# Patient Record
Sex: FEMALE | Race: ASIAN | NOT HISPANIC OR LATINO | Employment: PART TIME | URBAN - METROPOLITAN AREA
[De-identification: names, ages, dates, MRNs, and addresses within clinical notes are randomized per-mention and may not be internally consistent; named-entity substitution may affect disease eponyms.]

---

## 2019-11-16 ENCOUNTER — HOSPITAL ENCOUNTER (EMERGENCY)
Facility: HOSPITAL | Age: 59
Discharge: HOME | End: 2019-11-17
Attending: EMERGENCY MEDICINE | Admitting: EMERGENCY MEDICINE
Payer: MEDICAID

## 2019-11-16 DIAGNOSIS — R07.9 CHEST PAIN, UNSPECIFIED: Primary | ICD-10-CM

## 2019-11-16 DIAGNOSIS — E87.6 HYPOKALEMIA: ICD-10-CM

## 2019-11-16 PROCEDURE — 99285 EMERGENCY DEPT VISIT HI MDM: CPT

## 2019-11-17 ENCOUNTER — APPOINTMENT (EMERGENCY)
Dept: RADIOLOGY | Facility: HOSPITAL | Age: 59
End: 2019-11-17
Attending: EMERGENCY MEDICINE
Payer: MEDICAID

## 2019-11-17 VITALS
OXYGEN SATURATION: 96 % | SYSTOLIC BLOOD PRESSURE: 118 MMHG | RESPIRATION RATE: 15 BRPM | DIASTOLIC BLOOD PRESSURE: 65 MMHG | HEART RATE: 62 BPM | TEMPERATURE: 99.4 F

## 2019-11-17 LAB
ALBUMIN SERPL BCP-MCNC: 3.4 G/DL (ref 3.5–5)
ALP SERPL-CCNC: 77 U/L (ref 46–116)
ALT SERPL W P-5'-P-CCNC: 109 U/L (ref 12–78)
ANION GAP SERPL CALCULATED.3IONS-SCNC: 8 MMOL/L (ref 4–13)
AST SERPL W P-5'-P-CCNC: 58 U/L (ref 5–45)
BASOPHILS # BLD AUTO: 0.05 THOUSANDS/ΜL (ref 0–0.1)
BASOPHILS NFR BLD AUTO: 1 % (ref 0–1)
BILIRUB SERPL-MCNC: 0.3 MG/DL (ref 0.2–1)
BUN SERPL-MCNC: 17 MG/DL (ref 5–25)
CALCIUM SERPL-MCNC: 8.7 MG/DL (ref 8.3–10.1)
CHLORIDE SERPL-SCNC: 103 MMOL/L (ref 100–108)
CO2 SERPL-SCNC: 27 MMOL/L (ref 21–32)
CREAT SERPL-MCNC: 0.94 MG/DL (ref 0.6–1.3)
EOSINOPHIL # BLD AUTO: 0.07 THOUSAND/ΜL (ref 0–0.61)
EOSINOPHIL NFR BLD AUTO: 1 % (ref 0–6)
ERYTHROCYTE [DISTWIDTH] IN BLOOD BY AUTOMATED COUNT: 13.3 % (ref 11.6–15.1)
GFR SERPL CREATININE-BSD FRML MDRD: 66 ML/MIN/1.73SQ M
GLUCOSE SERPL-MCNC: 167 MG/DL (ref 65–140)
HCT VFR BLD AUTO: 41.4 % (ref 34.8–46.1)
HGB BLD-MCNC: 13.4 G/DL (ref 11.5–15.4)
IMM GRANULOCYTES # BLD AUTO: 0.01 THOUSAND/UL (ref 0–0.2)
IMM GRANULOCYTES NFR BLD AUTO: 0 % (ref 0–2)
LYMPHOCYTES # BLD AUTO: 1.7 THOUSANDS/ΜL (ref 0.6–4.47)
LYMPHOCYTES NFR BLD AUTO: 27 % (ref 14–44)
MCH RBC QN AUTO: 29.3 PG (ref 26.8–34.3)
MCHC RBC AUTO-ENTMCNC: 32.4 G/DL (ref 31.4–37.4)
MCV RBC AUTO: 91 FL (ref 82–98)
MONOCYTES # BLD AUTO: 0.49 THOUSAND/ΜL (ref 0.17–1.22)
MONOCYTES NFR BLD AUTO: 8 % (ref 4–12)
NEUTROPHILS # BLD AUTO: 3.96 THOUSANDS/ΜL (ref 1.85–7.62)
NEUTS SEG NFR BLD AUTO: 63 % (ref 43–75)
NRBC BLD AUTO-RTO: 0 /100 WBCS
PLATELET # BLD AUTO: 203 THOUSANDS/UL (ref 149–390)
PMV BLD AUTO: 9.8 FL (ref 8.9–12.7)
POTASSIUM SERPL-SCNC: 3.3 MMOL/L (ref 3.5–5.3)
PROT SERPL-MCNC: 7.2 G/DL (ref 6.4–8.2)
RBC # BLD AUTO: 4.57 MILLION/UL (ref 3.81–5.12)
SODIUM SERPL-SCNC: 138 MMOL/L (ref 136–145)
TROPONIN I SERPL-MCNC: <0.02 NG/ML
TROPONIN I SERPL-MCNC: <0.02 NG/ML
WBC # BLD AUTO: 6.28 THOUSAND/UL (ref 4.31–10.16)

## 2019-11-17 PROCEDURE — 93005 ELECTROCARDIOGRAM TRACING: CPT

## 2019-11-17 PROCEDURE — 80053 COMPREHEN METABOLIC PANEL: CPT | Performed by: EMERGENCY MEDICINE

## 2019-11-17 PROCEDURE — 71045 X-RAY EXAM CHEST 1 VIEW: CPT

## 2019-11-17 PROCEDURE — 84484 ASSAY OF TROPONIN QUANT: CPT | Performed by: EMERGENCY MEDICINE

## 2019-11-17 PROCEDURE — 85025 COMPLETE CBC W/AUTO DIFF WBC: CPT | Performed by: EMERGENCY MEDICINE

## 2019-11-17 PROCEDURE — 36415 COLL VENOUS BLD VENIPUNCTURE: CPT | Performed by: EMERGENCY MEDICINE

## 2019-11-17 RX ORDER — POTASSIUM CHLORIDE 20 MEQ/1
40 TABLET, EXTENDED RELEASE ORAL ONCE
Status: COMPLETED | OUTPATIENT
Start: 2019-11-17 | End: 2019-11-17

## 2019-11-17 RX ADMIN — POTASSIUM CHLORIDE 40 MEQ: 1500 TABLET, EXTENDED RELEASE ORAL at 02:16

## 2019-11-17 NOTE — ED NOTES
Language line used to advise and educate patient about potassium being a little low and will need to take the 2 pills ordered to increase level     Roverto Hopson RN  11/17/19 0509

## 2019-11-17 NOTE — ED NOTES
Patients discharge instructions given with assistance of interpretor via phone, patient voiced concerns regarding blood work and dry mouth, advised labs and EKG were normal, requested to sleep for 1 hour and did report brother will be picking up patient in the am, will be coming from [facility], [address] [place] [address]  11/17/19 0593

## 2019-11-17 NOTE — ED NOTES
Patient repeatedly voicing concerns of claustrophobia while on phone with ,      Maggy Encarnacion RN  11/17/19 3402

## 2019-11-17 NOTE — DISCHARGE INSTRUCTIONS
Chest Pain, Ambulatory Care   Barney LINCOLN: Evaluation of chest pain in primary care patients  Am Fam Physician 2011; 83(5):603-605  Shwetha WAN, Jihan DB, & Ramona Krishna: Emergency department and office-based evaluation of patients with chest pain  Díaz Clin Proc 2010; 85(3):284-299  Marleni JS, Irl Lucas, et al: NICE guidance  Chest pain of recent onset: assessment and diagnosis of recent onset chest pain or discomfort of suspected cardiac origin  Heart 2010; 96(12):974-978  Zulma Search E: Chest Pain  In: Aubrie Tish, Kay RESTREPO, [employer], et al, eds  [address] Emergency Medicine Consult, 4th ed  [address] [facility],[address] [place], [place], 2011  [address] [place] [address]: An algorithm for the diagnosis and management of chest pain in primary care  Med Clin [place] 2010; 42(8):942-373  © 2014 [address] is for End User's use only and may not be sold, redistributed or otherwise used for commercial purposes  All illustrations and images included in CareNotes® are the copyrighted property of A D A M , Inc  or [employer]  The above information is an  only  It is not intended as medical advice for individual conditions or treatments  Talk to your doctor, nurse or pharmacist before following any medical regimen to see if it is safe and effective for you  Return to the ER for further concerns  Chest Pain, Ambulatory Care   GENERAL INFORMATION:   Chest pain  can be caused by a range of conditions, from not serious to life-threatening  It may be caused by a heart attack or a blood clot in your lungs  Sometimes chest pain or pressure is caused by poor blood flow to your heart (angina)  Infection, inflammation, or a fracture in the bones or cartilage in your chest can cause pain or discomfort  Chest pain can also be a symptom of a digestive problem, such as acid reflux or a stomach ulcer     Common symptoms include the following: · Fever or sweating     · Nausea or vomiting     · Shortness of breath     · Discomfort or pressure that spreads from your chest to your back, jaw, or arm     · A racing or slow heartbeat     · Feeling weak, tired, or faint  Seek immediate care for the following symptoms:   · Any of the following signs of a heart attack:      ¨ Squeezing, pressure, or pain in your chest that lasts longer than 5 minutes or returns    ¨ Discomfort or pain in your back, neck, jaw, stomach, or arm     ¨ Trouble breathing     ¨ Nausea or vomiting    ¨ Lightheadedness or a sudden cold sweat, especially with trouble breathing         · Chest discomfort that gets worse, even with medicine    · Coughing or vomiting blood    · Black or bloody bowel movements     · Vomiting that does not stop, or pain when you swallow  Treatment for chest pain  may include medicine to treat your symptoms while he determines the cause of your chest pain  You may also need any of the following:  · Antiplatelets , such as aspirin, help prevent blood clots  Take your antiplatelet medicine exactly as directed  These medicines make it more likely for you to bleed or bruise  If you are told to take aspirin, do not take acetaminophen or ibuprofen instead  · Prescription pain medicine  may be given  Ask how to take this medicine safely  Do not smoke: If you smoke, it is never too late to quit  Smoking increases your risk for a heart attack and other heart and lung conditions  Ask your healthcare provider for information about how to stop smoking if you need help  Follow up with your healthcare provider as directed: You may need more tests  You may be referred to a specialist, such as a cardiologist or gastroenterologist  Write down your questions so you remember to ask them during your visits  CARE AGREEMENT:   You have the right to help plan your care  Learn about your health condition and how it may be treated   Discuss treatment options with your caregivers to decide what care you want to receive  You always have the right to refuse treatment  The above information is an  only  It is not intended as medical advice for individual conditions or treatments  Talk to your doctor, nurse or pharmacist before following any medical regimen to see if it is safe and effective for you  © 2014 [address] is for End User's use only and may not be sold, redistributed or otherwise used for commercial purposes  All illustrations and images included in CareNotes® are the copyrighted property of [employer] , [employer]  or Trino Peck  Chest Pain   WHAT YOU NEED TO KNOW:   Chest pain can be caused by a range of conditions, from not serious to life-threatening  Chest pain can be a symptom of a digestive problem, such as acid reflux or a stomach ulcer  An anxiety attack or a strong emotion, such as anger, can also cause chest pain  Infection, inflammation, or a fracture in the bones or cartilage in your chest can cause pain or discomfort  Sometimes chest pain or pressure is caused by poor blood flow to your heart (angina)  Chest pain may also be caused by life-threatening conditions such as a heart attack or blood clot in your lungs  DISCHARGE INSTRUCTIONS:   Call 911 if:   · You have any of the following signs of a heart attack:      ¨ Squeezing, pressure, or pain in your chest that lasts longer than 5 minutes or returns    ¨ Discomfort or pain in your back, neck, jaw, stomach, or arm     ¨ Trouble breathing    ¨ Nausea or vomiting    ¨ Lightheadedness or a sudden cold sweat, especially with chest pain or trouble breathing    Return to the emergency department if:   · You have chest discomfort that gets worse, even with medicine  · You cough or vomit blood  · Your bowel movements are black or bloody  · You cannot stop vomiting, or it hurts to swallow    Contact your healthcare provider if:   · You have questions or concerns about your condition or care  Medicines:   · Medicines  may be given to treat the cause of your chest pain  Examples include pain medicine, anxiety medicine, or medicines to increase blood flow to your heart  · Do not take certain medicines without asking your healthcare provider first   These include NSAIDs, herbal or vitamin supplements, or hormones (estrogen or progestin)  · Take your medicine as directed  Contact your healthcare provider if you think your medicine is not helping or if you have side effects  Tell him or her if you are allergic to any medicine  Keep a list of the medicines, vitamins, and herbs you take  Include the amounts, and when and why you take them  Bring the list or the pill bottles to follow-up visits  Carry your medicine list with you in case of an emergency  Follow up with your healthcare provider within 72 hours, or as directed: You may need to return for more tests to find the cause of your chest pain  You may be referred to a specialist, such as a cardiologist or gastroenterologist  Write down your questions so you remember to ask them during your visits  Healthy living tips: The following are general healthy guidelines  If your chest pain is caused by a heart problem, your healthcare provider will give you specific guidelines to follow  · Do not smoke  Nicotine and other chemicals in cigarettes and cigars can cause lung and heart damage  Ask your healthcare provider for information if you currently smoke and need help to quit  E-cigarettes or smokeless tobacco still contain nicotine  Talk to your healthcare provider before you use these products  · Eat a variety of healthy, low-fat foods  Healthy foods include fruits, vegetables, whole-grain breads, low-fat dairy products, beans, lean meats, and fish  Ask for more information about a heart healthy diet  · Ask about activity    Your healthcare provider will tell you which activities to limit or avoid  Ask when you can drive, return to work, and have sex  Ask about the best exercise plan for you  · Maintain a healthy weight  Ask your healthcare provider how much you should weigh  Ask him or her to help you create a weight loss plan if you are overweight  · Get the flu and pneumonia vaccines  All adults should get the influenza (flu) vaccine  Get it every year as soon as it becomes available  The pneumococcal vaccine is given to adults aged 72 years or older  The vaccine is given every 5 years to prevent pneumococcal disease, such as pneumonia  © 2017 [address] [facility] Information is for End User's use only and may not be sold, redistributed or otherwise used for commercial purposes  All illustrations and images included in CareNotes® are the copyrighted property of A D A M , Inc  or Trino Peck  The above information is an  only  It is not intended as medical advice for individual conditions or treatments  Talk to your doctor, nurse or pharmacist before following any medical regimen to see if it is safe and effective for you  Chest Pain, Ambulatory Care   GENERAL INFORMATION:   Chest pain  can be caused by a range of conditions, from not serious to life-threatening  It may be caused by a heart attack or a blood clot in your lungs  Sometimes chest pain or pressure is caused by poor blood flow to your heart (angina)  Infection, inflammation, or a fracture in the bones or cartilage in your chest can cause pain or discomfort  Chest pain can also be a symptom of a digestive problem, such as acid reflux or a stomach ulcer     Common symptoms include the following:   · Fever or sweating     · Nausea or vomiting     · Shortness of breath     · Discomfort or pressure that spreads from your chest to your back, jaw, or arm     · A racing or slow heartbeat     · Feeling weak, tired, or faint  Seek immediate care for the following symptoms:   · Any of the following signs of a heart attack:      ¨ Squeezing, pressure, or pain in your chest that lasts longer than 5 minutes or returns    ¨ Discomfort or pain in your back, neck, jaw, stomach, or arm     ¨ Trouble breathing     ¨ Nausea or vomiting    ¨ Lightheadedness or a sudden cold sweat, especially with trouble breathing         · Chest discomfort that gets worse, even with medicine    · Coughing or vomiting blood    · Black or bloody bowel movements     · Vomiting that does not stop, or pain when you swallow  Treatment for chest pain  may include medicine to treat your symptoms while he determines the cause of your chest pain  You may also need any of the following:  · Antiplatelets , such as aspirin, help prevent blood clots  Take your antiplatelet medicine exactly as directed  These medicines make it more likely for you to bleed or bruise  If you are told to take aspirin, do not take acetaminophen or ibuprofen instead  · Prescription pain medicine  may be given  Ask how to take this medicine safely  Do not smoke: If you smoke, it is never too late to quit  Smoking increases your risk for a heart attack and other heart and lung conditions  Ask your healthcare provider for information about how to stop smoking if you need help  Follow up with your healthcare provider as directed: You may need more tests  You may be referred to a specialist, such as a cardiologist or gastroenterologist  Write down your questions so you remember to ask them during your visits  CARE AGREEMENT:   You have the right to help plan your care  Learn about your health condition and how it may be treated  Discuss treatment options with your caregivers to decide what care you want to receive  You always have the right to refuse treatment  The above information is an  only  It is not intended as medical advice for individual conditions or treatments   Talk to your doctor, nurse or pharmacist before following any medical regimen to see if it is safe and effective for you  © 2014 [address] is for End User's use only and may not be sold, redistributed or otherwise used for commercial purposes  All illustrations and images included in CareNotes® are the copyrighted property of A D A M , Inc  or Trino Peck  Chest Pain   WHAT YOU NEED TO KNOW:   Chest pain can be caused by a range of conditions, from not serious to life-threatening  Chest pain can be a symptom of a digestive problem, such as acid reflux or a stomach ulcer  An anxiety attack or a strong emotion, such as anger, can also cause chest pain  Infection, inflammation, or a fracture in the bones or cartilage in your chest can cause pain or discomfort  Sometimes chest pain or pressure is caused by poor blood flow to your heart (angina)  Chest pain may also be caused by life-threatening conditions such as a heart attack or blood clot in your lungs  DISCHARGE INSTRUCTIONS:   Call 911 if:   · You have any of the following signs of a heart attack:      ¨ Squeezing, pressure, or pain in your chest that lasts longer than 5 minutes or returns    ¨ Discomfort or pain in your back, neck, jaw, stomach, or arm     ¨ Trouble breathing    ¨ Nausea or vomiting    ¨ Lightheadedness or a sudden cold sweat, especially with chest pain or trouble breathing    Return to the emergency department if:   · You have chest discomfort that gets worse, even with medicine  · You cough or vomit blood  · Your bowel movements are black or bloody  · You cannot stop vomiting, or it hurts to swallow  Contact your healthcare provider if:   · You have questions or concerns about your condition or care  Medicines:   · Medicines  may be given to treat the cause of your chest pain  Examples include pain medicine, anxiety medicine, or medicines to increase blood flow to your heart       · Do not take certain medicines without asking your healthcare provider first   These include NSAIDs, herbal or vitamin supplements, or hormones (estrogen or progestin)  · Take your medicine as directed  Contact your healthcare provider if you think your medicine is not helping or if you have side effects  Tell him or her if you are allergic to any medicine  Keep a list of the medicines, vitamins, and herbs you take  Include the amounts, and when and why you take them  Bring the list or the pill bottles to follow-up visits  Carry your medicine list with you in case of an emergency  Follow up with your healthcare provider within 72 hours, or as directed: You may need to return for more tests to find the cause of your chest pain  You may be referred to a specialist, such as a cardiologist or gastroenterologist  Write down your questions so you remember to ask them during your visits  Healthy living tips: The following are general healthy guidelines  If your chest pain is caused by a heart problem, your healthcare provider will give you specific guidelines to follow  · Do not smoke  Nicotine and other chemicals in cigarettes and cigars can cause lung and heart damage  Ask your healthcare provider for information if you currently smoke and need help to quit  E-cigarettes or smokeless tobacco still contain nicotine  Talk to your healthcare provider before you use these products  · Eat a variety of healthy, low-fat foods  Healthy foods include fruits, vegetables, whole-grain breads, low-fat dairy products, beans, lean meats, and fish  Ask for more information about a heart healthy diet  · Ask about activity  Your healthcare provider will tell you which activities to limit or avoid  Ask when you can drive, return to work, and have sex  Ask about the best exercise plan for you  · Maintain a healthy weight  Ask your healthcare provider how much you should weigh  Ask him or her to help you create a weight loss plan if you are overweight  · Get the flu and pneumonia vaccines    All adults should get the influenza (flu) vaccine  Get it every year as soon as it becomes available  The pneumococcal vaccine is given to adults aged 72 years or older  The vaccine is given every 5 years to prevent pneumococcal disease, such as pneumonia  © 2017 [address] Information is for End User's use only and may not be sold, redistributed or otherwise used for commercial purposes  All illustrations and images included in CareNotes® are the copyrighted property of A D A M , Inc  or Trino Peck  The above information is an  only  It is not intended as medical advice for individual conditions or treatments  Talk to your doctor, nurse or pharmacist before following any medical regimen to see if it is safe and effective for you  Chest Pain, Ambulatory Care   Barney MH: Evaluation of chest pain in primary care patients  Am Fam Physician 2011; 83(5):603-605  Shwetha MC, Jihan DB, & Marissa Ra: Emergency department and office-based evaluation of patients with chest pain  Díaz Clin Proc 2010; 85(3):284-299  Yepez JS, Vika Blackburn et al: NICE guidance  Chest pain of recent onset: assessment and diagnosis of recent onset chest pain or discomfort of suspected cardiac origin  Heart 2010; 96(12):974-978  Ulises Vaughan E: Chest Pain  In: Blaise Valles, Kay RM, [employer], et al, eds  [address] Emergency Medicine Consult, 4th ed  [address] [facility],[address], [address] [place] [address], 2011  [address] [facility]: An algorithm for the diagnosis and management of chest pain in primary care  Med Clin [place] 2010; 25(8):876-417  © 2014 [address] is for End User's use only and may not be sold, redistributed or otherwise used for commercial purposes  All illustrations and images included in CareNotes® are the copyrighted property of A D A M [employer]  or Trino Peck  The above information is an  only  It is not intended as medical advice for individual conditions or treatments  Talk to your doctor, nurse or pharmacist before following any medical regimen to see if it is safe and effective for you

## 2019-11-18 LAB
ATRIAL RATE: 81 BPM
P AXIS: 73 DEGREES
PR INTERVAL: 136 MS
QRS AXIS: 28 DEGREES
QRSD INTERVAL: 82 MS
QT INTERVAL: 390 MS
QTC INTERVAL: 453 MS
T WAVE AXIS: 26 DEGREES
VENTRICULAR RATE: 81 BPM

## 2019-11-18 PROCEDURE — 93010 ELECTROCARDIOGRAM REPORT: CPT | Performed by: INTERNAL MEDICINE

## 2023-07-12 NOTE — ED PROVIDER NOTES
History  Chief Complaint   Patient presents with    Chest Pain     patient was experiencing chest discomfort midsternal chest, 2 or 3 years ago 7/20/2016 hospitalized, worsening 22:00 while on bus from [address] [place] [address] to [facility], unsure of what meds she is on     Pt in ER with c/o chest discomfort which began at approx 10p, while she was on a bus en route from [facility] to [place] to visit her son and daughter  Pt disembarked from the bus and was brought to the ER with EMS  She states that she has a hx of anxiety, which typically presents as chest discomfort, which she experiences when she is in enclosed spaces alone  She states that she typically travels with a family member, which alleviates her symptoms, but she is travelling alone today  Pt speaks no [place]  History provided by:  Patient   used: Yes (Mandarin - translated by language line)    Chest Pain   Pain location:  Substernal area  Pain quality: dull    Pain radiates to:  Does not radiate  Pain radiates to the back: no    Pain severity:  Mild  Onset quality:  Sudden  Timing:  Constant  Progression:  Partially resolved  Chronicity:  Recurrent  Relieved by:  Nothing  Worsened by:  Nothing tried  Ineffective treatments:  None tried  Associated symptoms: no abdominal pain, no back pain, no cough, no fever, no nausea, no shortness of breath and not vomiting        None       Past Medical History:   Diagnosis Date    Cardiac disease        History reviewed  No pertinent surgical history  History reviewed  No pertinent family history  I have reviewed and agree with the history as documented  Social History     Tobacco Use    Smoking status: Never Smoker    Smokeless tobacco: Never Used   Substance Use Topics    Alcohol use: Not on file    Drug use: Never        Review of Systems   Constitutional: Negative for chills and fever  Respiratory: Negative for cough, chest tightness and shortness of breath      Cardiovascular: Positive for chest pain  Gastrointestinal: Negative for abdominal pain, diarrhea, nausea and vomiting  Genitourinary: Negative for dysuria, frequency, hematuria and urgency  Musculoskeletal: Negative for back pain, neck pain and neck stiffness  All other systems reviewed and are negative  Physical Exam  Physical Exam   Constitutional: She is oriented to person, place, and time  She appears well-developed and well-nourished  No distress  HENT:   Head: Normocephalic and atraumatic  Eyes: Pupils are equal, round, and reactive to light  Conjunctivae are normal    Neck: Normal range of motion  Neck supple  Cardiovascular: Normal rate, regular rhythm and normal heart sounds  No murmur heard  Pulmonary/Chest: Effort normal and breath sounds normal  No respiratory distress  Abdominal: Soft  Bowel sounds are normal  She exhibits no distension  There is no tenderness  Musculoskeletal: Normal range of motion  She exhibits no edema or deformity  Neurological: She is alert and oriented to person, place, and time  No cranial nerve deficit  Skin: Skin is warm and dry  No rash noted  She is not diaphoretic  No pallor  Psychiatric: She has a normal mood and affect  Her behavior is normal    Nursing note and vitals reviewed        Vital Signs  ED Triage Vitals   Temperature Pulse Respirations Blood Pressure SpO2   11/17/19 0001 11/16/19 2347 11/16/19 2347 11/16/19 2347 11/16/19 2347   99 4 °F (37 4 °C) 88 16 (!) 171/102 97 %      Temp src Heart Rate Source Patient Position - Orthostatic VS BP Location FiO2 (%)   -- -- -- 11/16/19 2347 --      Right arm       Pain Score       11/16/19 2347       No Pain           Vitals:    11/17/19 0145 11/17/19 0215 11/17/19 0245 11/17/19 0415   BP: 143/79 155/75 133/70 118/65   Pulse: 74 76 68 62         Visual Acuity      ED Medications  Medications   potassium chloride (K-DUR,KLOR-CON) CR tablet 40 mEq (40 mEq Oral Given 11/17/19 0216)       Diagnostic Studies  Results Reviewed     Procedure Component Value Units Date/Time    Troponin I [[id number]]  (Normal) Collected:  11/17/19 0317    Lab Status:  Final result Specimen:  Blood from Arm, Left Updated:  11/17/19 0340     Troponin I <0 02 ng/mL     Troponin I [[id number]]  (Normal) Collected:  11/17/19 0013    Lab Status:  Final result Specimen:  Blood from Arm, Left Updated:  11/17/19 0039     Troponin I <0 02 ng/mL     Comprehensive metabolic panel [[id number]]  (Abnormal) Collected:  11/17/19 0013    Lab Status:  Final result Specimen:  Blood from Arm, Left Updated:  11/17/19 0035     Sodium 138 mmol/L      Potassium 3 3 mmol/L      Chloride 103 mmol/L      CO2 27 mmol/L      ANION GAP 8 mmol/L      BUN 17 mg/dL      Creatinine 0 94 mg/dL      Glucose 167 mg/dL      Calcium 8 7 mg/dL      AST 58 U/L       U/L      Alkaline Phosphatase 77 U/L      Total Protein 7 2 g/dL      Albumin 3 4 g/dL      Total Bilirubin 0 30 mg/dL      eGFR 66 ml/min/1 73sq m     Narrative:       [facility] guidelines for Chronic Kidney Disease (CKD):     Stage 1 with normal or high GFR (GFR > 90 mL/min/1 73 square meters)    Stage 2 Mild CKD (GFR = 60-89 mL/min/1 73 square meters)    Stage 3A Moderate CKD (GFR = 45-59 mL/min/1 73 square meters)    Stage 3B Moderate CKD (GFR = 30-44 mL/min/1 73 square meters)    Stage 4 Severe CKD (GFR = 15-29 mL/min/1 73 square meters)    Stage 5 End Stage CKD (GFR <15 mL/min/1 73 square meters)  Note: GFR calculation is accurate only with a steady state creatinine    CBC and differential [[id number]] Collected:  11/17/19 0013    Lab Status:  Final result Specimen:  Blood from Arm, Left Updated:  11/17/19 0018     WBC 6 28 Thousand/uL      RBC 4 57 Million/uL      Hemoglobin 13 4 g/dL      Hematocrit 41 4 %      MCV 91 fL      MCH 29 3 pg      MCHC 32 4 g/dL      RDW 13 3 %      MPV 9 8 fL      Platelets 247 Thousands/uL      nRBC 0 /100 WBCs      Neutrophils Relative 63 %      Immat GRANS % 0 %      Lymphocytes Relative 27 %      Monocytes Relative 8 %      Eosinophils Relative 1 %      Basophils Relative 1 %      Neutrophils Absolute 3 96 Thousands/µL      Immature Grans Absolute 0 01 Thousand/uL      Lymphocytes Absolute 1 70 Thousands/µL      Monocytes Absolute 0 49 Thousand/µL      Eosinophils Absolute 0 07 Thousand/µL      Basophils Absolute 0 05 Thousands/µL                  XR chest 1 view portable   ED Interpretation by Jose Bailey DO (11/17 0103)   nad                 Procedures  ECG 12 Lead Documentation Only  Date/Time: 11/17/2019 12:08 AM  Performed by: Jose Bailey DO  Authorized by: Jose Bailey DO     Indications / Diagnosis:  Chest pain  ECG reviewed by me, the ED Provider: yes    Patient location:  ED  Previous ECG:     Previous ECG:  Unavailable    Comparison to cardiac monitor: Yes    Interpretation:     Interpretation: normal    Rate:     ECG rate:  81bpm    ECG rate assessment: normal    Rhythm:     Rhythm: sinus rhythm    Ectopy:     Ectopy: none    QRS:     QRS axis:  Normal  Conduction:     Conduction: normal    ST segments:     ST segments:  Normal  T waves:     T waves: normal             ED Course         HEART Risk Score      Most Recent Value   History  0 Filed at: 11/17/2019 0103   ECG  0 Filed at: 11/17/2019 0103   Age  1 Filed at: 11/17/2019 0103   Risk Factors  1 Filed at: 11/17/2019 0103   Troponin  0 Filed at: 11/17/2019 0103   Heart Score Risk Calculator   History  0 Filed at: 11/17/2019 0103   ECG  0 Filed at: 11/17/2019 0103   Age  1 Filed at: 11/17/2019 0103   Risk Factors  1 Filed at: 11/17/2019 0103   Troponin  0 Filed at: 11/17/2019 0103   HEART Score  2 Filed at: 11/17/2019 0103   HEART Score  2 Filed at: 11/17/2019 0103                            MDM  Number of Diagnoses or Management Options  Chest pain, unspecified:   Diagnosis management comments: Delta trop neg  Pt is pain free   Will d/c to home       Amount and/or Complexity of Data Reviewed  Clinical lab tests: ordered and reviewed  Tests in the radiology section of CPT®: reviewed and ordered    Risk of Complications, Morbidity, and/or Mortality  Presenting problems: moderate  Diagnostic procedures: moderate  Management options: moderate    Patient Progress  Patient progress: stable      Disposition  Final diagnoses:   Chest pain, unspecified   Hypokalemia     Time reflects when diagnosis was documented in both MDM as applicable and the Disposition within this note     Time User Action Codes Description Comment    11/17/2019  3:54 AM Alysa WHALEY Add [R07 9] Chest pain, unspecified     11/17/2019  5:32 AM Alysa Jordan [E87 6] Hypokalemia       ED Disposition     ED Disposition Condition Date/Time Comment    Discharge Stable Sun Nov 17, 2019  3:45 AM Reji Barton discharge to home/self care  Follow-up Information     Follow up With Specialties Details Why Contact Noelle Rosales  Schedule an appointment as soon as possible for a visit in 1 day [facility]  [phone]            Patient's Medications    No medications on file     No discharge procedures on file      ED Provider  Electronically Signed by           Andre Christie DO  11/17/19 9073 [facility]